# Patient Record
Sex: MALE | Race: WHITE
[De-identification: names, ages, dates, MRNs, and addresses within clinical notes are randomized per-mention and may not be internally consistent; named-entity substitution may affect disease eponyms.]

---

## 2018-06-18 ENCOUNTER — HOSPITAL ENCOUNTER (EMERGENCY)
Dept: HOSPITAL 80 - CED | Age: 23
Discharge: HOME | End: 2018-06-18
Payer: COMMERCIAL

## 2018-06-18 VITALS — SYSTOLIC BLOOD PRESSURE: 131 MMHG | DIASTOLIC BLOOD PRESSURE: 83 MMHG
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
## 2018-06-18 DIAGNOSIS — Z23: ICD-10-CM
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
## 2018-06-18 DIAGNOSIS — F17.200: ICD-10-CM

## 2018-06-18 DIAGNOSIS — S01.25XA: Primary | ICD-10-CM
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
## 2018-06-18 PROCEDURE — 09QKXZZ REPAIR NASAL MUCOSA AND SOFT TISSUE, EXTERNAL APPROACH: ICD-10-PCS | Performed by: EMERGENCY MEDICINE

## 2018-06-18 NOTE — EDPHY
H & P


Stated Complaint: Pt. states was playing with dog at "[place]", dog bit 

tip of nose


Time Seen by Provider: 06/18/18 15:47


HPI/ROS: 





CHIEF COMPLAINT:  Dog bite





HISTORY OF PRESENT ILLNESS:  Patient is a 23-year-old man who comes to the 

emergency department after being bit in the nose by a dog.  He works at a dog 

[employer].  He states that he is playing with a dog that was looking his face 

when swollen startled the dog in it bit him and nose.  He has a 3 cm laceration 

to the tip of his nose.  It is not through and through to the nares.  This 

happened just prior to arrival.  He denies other injuries.








REVIEW OF SYSTEMS:


Constitutional:  denies: chills, fever, recent illness, recent injury


EENTM:  See HPI denies: blurred vision, double vision, nose congestion


Respiratory: denies: cough, shortness of breath


Cardiac: denies: chest pain, irregular heart rate, lightheadedness, palpitations


Gastrointestinal/Abdominal: denies: abdominal pain, diarrhea, nausea, vomiting, 

blood streaked stools


Genitourinary: denies: dysuria, frequency, hematuria, pain


Musculoskeletal: denies: joint pain, muscle pain


Skin:  See HPI


Neurological: denies: headache, numbness, paresthesia, tingling, dizziness, 

weakness


Hematologic/Lymphatic: denies: blood clots, easy bleeding, easy bruising


Immunologic/allergic: denies: HIV/AIDS, transplant








EXAM:


GENERAL:  Well-appearing, well-nourished and in no acute distress.


HEAD:  Atraumatic, normocephalic.


EYES:  Pupils equal round and reactive to light, extraocular movements intact, 

sclera anicteric, conjunctiva are normal.


ENT:  3 cm laceration at the tip of the nose that extends to the left aspect.  

It is flap-like.  It is about half a cm deep but is not through and through to 

the nares.  TMs normal, nares patent, oropharynx clear without exudates.  Moist 

mucous membranes.


NECK:  Normal range of motion, supple without lymphadenopathy or JVD.


LUNGS:  Breath sounds clear to auscultation bilaterally and equal.  No wheezes 

rales or rhonchi.


HEART:  Regular rate and rhythm without murmurs, rubs or gallops.


ABDOMEN:  Soft, nontender, normoactive bowel sounds.  No guarding, no rebound.  

No masses appreciated.


BACK:  No CVA tenderness, no spinal tenderness, step-offs or deformities


EXTREMITIES:  Normal range of motion, no pitting or edema.  No clubbing or 

cyanosis.


NEUROLOGICAL:  Cranial nerves II through XII grossly intact.  Normal speech, 

normal gait.  5/5 strength, normal movement in all extremities, normal sensation


PSYCH:  Normal mood, normal affect.


SKIN:  See above








Source: Patient


Exam Limitations: No limitations





- Personal History


Current Tetanus Diphtheria and Acellular Pertussis (TDAP): Unsure





- Medical/Surgical History


Hx Asthma: No


Hx Chronic Respiratory Disease: No


Hx Diabetes: No


Hx Cardiac Disease: No


Hx Renal Disease: No


Hx Cirrhosis: No


Hx Alcoholism: No


Hx HIV/AIDS: No


Hx Splenectomy or Spleen Trauma: No


Other PMH: MEd hx-anxiety.  Surg-none





- Family History


Significant Family History: No pertinent family hx





- Social History


Smoking Status: Light smoker


Alcohol Use: Sober


Drug Use: None


Constitutional: 


 Initial Vital Signs











Temperature (C)  37.1 C   06/18/18 15:44


 


Heart Rate  79   06/18/18 15:44


 


Respiratory Rate  16   06/18/18 15:44


 


Blood Pressure  140/90 H  06/18/18 15:44


 


O2 Sat (%)  96   06/18/18 15:44








 











O2 Delivery Mode               Room Air














Allergies/Adverse Reactions: 


 





No Known Allergies Allergy (Verified 06/18/18 15:43)


 








Home Medications: 














 Medication  Instructions  Recorded


 


Amoxicillin/Clavulanate Pot 875 mg PO BID #14 tab 06/18/18





[Augmentin 875Mg]  


 


Zoloft 25mg (*)  06/18/18














Medical Decision Making


Procedures: 





Procedure:  Laceration repair.





Verbal consent was obtained from the patient.  The 3 cm nose laceration was 

anesthetized with 0.5% bupivacaine locally infiltrated.  The wound was 

irrigated copiously according to protocol, draped and explored to its base.  It 

was approximately 1/2 cm deep.  There were no deep structures involved.  No 

tendon, nerve, or vascular injury was identified when explored.  No foreign 

body was identified.  The wound was repaired with 7.0 Prolene, 6 sutures, 

interrupted.  The wound repair was moderately complex with flap realignment.  

The procedure was performed by myself.  A dressing was then placed with sterile 

gauze and bacitracin.


ED Course/Re-evaluation: 





Patient tolerated the procedure well.  We discussed suture care and removal.  

We discussed scarring.  We discussed prevention.  He is eager to go home.  His 

tetanus vaccine was updated.  I will start him on antibiotics because of the 

high risk of infection.


Differential Diagnosis: 





Partial list of the Differential diagnosis considered include but were not 

limited to;  laceration, fracture, infection and although unlikely based on the 

history and physical exam, I also considered foreign body, perforation.  I 

discussed these differential diagnoses and the plan with the patient as well as 

the usual and expected course.  The patient understands that the diagnosis is 

provisional and that in medicine we are not always correct and that further 

workup is often warranted.  Usual and customary warnings were given.  All of 

the patient's questions were answered.  The patient was instructed to return to 

the emergency department should the symptoms at all worsen or return, otherwise 

to followup with the physician as we discussed.





- Data Points


Medications Given: 


 








Discontinued Medications





Amoxicillin/Clavulanate Potassium (Augmentin 875mg)  875 mg PO EDNOW ONE


   PRN Reason: Protocol


   Stop: 06/18/18 16:00


   Last Admin: 06/18/18 16:48 Dose:  875 mg


Diphtheria/Tetanus/Acell Pertussis (Boostrix)  0.5 ml IM .ONCE ONE


   Stop: 06/18/18 15:53


   Last Admin: 06/18/18 16:49 Dose:  0.5 ml








Departure





- Departure


Disposition: Home, Routine, Self-Care


Clinical Impression: 


 Laceration





Dog bite of nose


Qualifiers:


 Encounter type: initial encounter Qualified Code(s): S01.25XA - Open bite of 

nose, initial encounter; W54.0XXA - Bitten by dog, initial encounter; W54.0XXA 

- Bitten by dog, initial encounter





Condition: Fair


Instructions:  Amoxicillin/Clavulanate Potassium (By mouth), Animal Bite (ED), 

Laceration (ED)


Additional Instructions: 


Have your stitches removed in 7 days


Referrals: 


Stefany Bowie MD [Primary Care Provider] - As per Instructions


Prescriptions: 


Amoxicillin/Clavulanate Pot [Augmentin 875Mg] 875 mg PO BID #14 tab